# Patient Record
Sex: FEMALE | Race: WHITE | NOT HISPANIC OR LATINO | URBAN - METROPOLITAN AREA
[De-identification: names, ages, dates, MRNs, and addresses within clinical notes are randomized per-mention and may not be internally consistent; named-entity substitution may affect disease eponyms.]

---

## 2017-10-24 ENCOUNTER — OFFICE (OUTPATIENT)
Dept: URBAN - METROPOLITAN AREA CLINIC 33 | Facility: CLINIC | Age: 62
End: 2017-10-24

## 2017-10-24 VITALS
WEIGHT: 157 LBS | HEART RATE: 80 BPM | TEMPERATURE: 97.7 F | HEIGHT: 65 IN | DIASTOLIC BLOOD PRESSURE: 87 MMHG | SYSTOLIC BLOOD PRESSURE: 124 MMHG

## 2017-10-24 DIAGNOSIS — K58.0 IRRITABLE BOWEL SYNDROME WITH DIARRHEA: ICD-10-CM

## 2017-10-24 DIAGNOSIS — R14.0 ABDOMINAL DISTENSION (GASEOUS): ICD-10-CM

## 2017-10-24 PROCEDURE — 99214 OFFICE O/P EST MOD 30 MIN: CPT

## 2017-10-24 RX ORDER — RIFAXIMIN 550 MG/1
TABLET ORAL
Qty: 42 | Refills: 0 | Status: COMPLETED
Start: 2017-10-24 | End: 2018-02-20

## 2017-10-24 RX ORDER — HYOSCYAMINE SULFATE 0.12 MG/1
TABLET ORAL; SUBLINGUAL
Qty: 60 | Refills: 1 | Status: COMPLETED
Start: 2016-07-29 | End: 2019-01-29

## 2017-10-24 NOTE — SERVICEHPINOTES
63 yo female NATI presents for f/u IBS symptoms. 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 She can have urgent diarrhea up to 5-8x/day and this can go on for several weeks and can also happen through the night. She can then have weeks of constipation where she either won't go for a week or will only pass small amount of stool. Has tried Linzess (caused too much diarrhea) and Amitiza (didn't work at either dose). Probiotics thus far haven't been too helpful either. She gets a lot of bloating every day - worse with eating. Low FODMAPs diet is helpful. Wheat seems to make symptoms worse so she tries to avoid those. She also finds hyoscyamine to be helpful prn cramping. She had negative celiac panel last year and benign findings upon colonoscopy and SBFT in 2015. She denies any weight loss, blood in stools, fevers, N/V, or any other concerns.

## 2018-02-20 ENCOUNTER — OFFICE (OUTPATIENT)
Dept: URBAN - METROPOLITAN AREA CLINIC 78 | Facility: CLINIC | Age: 63
End: 2018-02-20

## 2018-02-20 VITALS
DIASTOLIC BLOOD PRESSURE: 71 MMHG | HEIGHT: 65 IN | TEMPERATURE: 98.2 F | HEART RATE: 85 BPM | WEIGHT: 157 LBS | SYSTOLIC BLOOD PRESSURE: 117 MMHG

## 2018-02-20 DIAGNOSIS — R14.0 ABDOMINAL DISTENSION (GASEOUS): ICD-10-CM

## 2018-02-20 DIAGNOSIS — K58.0 IRRITABLE BOWEL SYNDROME WITH DIARRHEA: ICD-10-CM

## 2018-02-20 PROCEDURE — 99213 OFFICE O/P EST LOW 20 MIN: CPT

## 2018-02-20 RX ORDER — RIFAXIMIN 550 MG/1
TABLET ORAL
Qty: 42 | Refills: 0 | Status: COMPLETED
Start: 2018-02-20 | End: 2019-01-29

## 2018-02-20 NOTE — SERVICEHPINOTES
61 yo female NATHALY.[email or web address]. presents for f/u IBS symptoms. 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 Last fall she took Xifaxan and felt better for awhile. However, has been having worse symptoms again over the past 4-6 weeks. She had to take abx for pneumonia in January and thinks this may have contributed to return of her symptoms. She does feel better on low FODMAPs (wheat and onions are worse for her). Currently she has a lot of abdominal bloating and discomfort, typically worse after meals. She has been having a daily BM but stools are hard. She can also have bouts of diarrhea. She is wanting to take another course of Xifaxan given the improvement she had with this. No new concerns today. Her weight is stable.  Prior hx: She can have urgent diarrhea up to 5-8x/day and this can go on for several weeks and can also happen through the night. She can then have weeks of constipation where she either won't go for a week or will only pass small amount of stool. Has tried Linzess (caused too much diarrhea) and Amitiza (didn't work at either dose). Probiotics thus far haven't been too helpful either. She gets a lot of bloating every day - worse with eating. Low FODMAPs diet is helpful. Wheat seems to make symptoms worse so she tries to avoid those. She also finds hyoscyamine to be helpful prn cramping. She had negative celiac panel in 2016 and benign findings upon colonoscopy and SBFT in 2015.

## 2019-01-29 ENCOUNTER — OFFICE (OUTPATIENT)
Dept: URBAN - METROPOLITAN AREA CLINIC 33 | Facility: CLINIC | Age: 64
End: 2019-01-29

## 2019-01-29 VITALS
WEIGHT: 153 LBS | HEART RATE: 89 BPM | DIASTOLIC BLOOD PRESSURE: 69 MMHG | TEMPERATURE: 97.2 F | SYSTOLIC BLOOD PRESSURE: 105 MMHG | HEIGHT: 65 IN

## 2019-01-29 DIAGNOSIS — R14.0 ABDOMINAL DISTENSION (GASEOUS): ICD-10-CM

## 2019-01-29 DIAGNOSIS — K58.0 IRRITABLE BOWEL SYNDROME WITH DIARRHEA: ICD-10-CM

## 2019-01-29 PROCEDURE — 99214 OFFICE O/P EST MOD 30 MIN: CPT

## 2019-01-29 NOTE — SERVICEHPINOTES
62 yo female NATHALY.SHIRLEY. presents for f/u IBS. 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 In late 2017 she took Xifaxan and felt better for awhile. In February 2018 she took a repeat course and states she has been doing much better since then. Bloating is much-improved. She can still have intermittent bouts of diarrhea which seem random. She can have weeks or even a couple of months at a time without any diarrhea, but can then have one or several days with more severe bouts (multiple episodes) of diarrhea. She is aware of FODMAPs and other dietary considerations. Her weight is stable. She finds hyoscyamine to be helpful prn cramping. She had negative celiac panel in 2016 and benign findings upon colonoscopy and SBFT in 2015.

## 2021-04-30 ENCOUNTER — OFFICE (OUTPATIENT)
Dept: URBAN - METROPOLITAN AREA CLINIC 34 | Facility: CLINIC | Age: 66
End: 2021-04-30

## 2021-04-30 VITALS
SYSTOLIC BLOOD PRESSURE: 135 MMHG | HEIGHT: 65 IN | TEMPERATURE: 97.5 F | DIASTOLIC BLOOD PRESSURE: 95 MMHG | WEIGHT: 156 LBS | HEART RATE: 70 BPM

## 2021-04-30 DIAGNOSIS — K58.0 IRRITABLE BOWEL SYNDROME WITH DIARRHEA: ICD-10-CM

## 2021-04-30 DIAGNOSIS — R14.0 ABDOMINAL DISTENSION (GASEOUS): ICD-10-CM

## 2021-04-30 PROCEDURE — 99214 OFFICE O/P EST MOD 30 MIN: CPT | Performed by: PHYSICIAN ASSISTANT

## 2021-04-30 RX ORDER — RIFAXIMIN 550 MG/1
TABLET ORAL
Qty: 42 | Refills: 0 | Status: ACTIVE
Start: 2021-04-30

## 2021-04-30 NOTE — SERVICEHPINOTES
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 presents for f/u IBS. 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 In late 2017 she took Xifaxan and felt better for awhile. In February 2018 she took a repeat course has done much better overall since then, though will have occasional symptoms. As of the past couple of months, she has recurrent symptoms of irregularity, bloating, and abdominal discomfort happening most days. Similar to what she had in the past. She would like to take another course of Xifaxan. Denies blood in stools, fevers, weight loss, or any other concerns. She is aware of FODMAPs and other dietary considerations. She has used hyoscyamine PRN which can be helpful for cramping. She had negative celiac panel in 2016 and benign findings upon colonoscopy and SBFT in 2015.